# Patient Record
Sex: MALE | Race: WHITE | NOT HISPANIC OR LATINO | URBAN - METROPOLITAN AREA
[De-identification: names, ages, dates, MRNs, and addresses within clinical notes are randomized per-mention and may not be internally consistent; named-entity substitution may affect disease eponyms.]

---

## 2017-05-05 ENCOUNTER — TRANSFERRED RECORDS (OUTPATIENT)
Dept: HEALTH INFORMATION MANAGEMENT | Facility: CLINIC | Age: 42
End: 2017-05-05

## 2017-05-07 ENCOUNTER — HOSPITAL ENCOUNTER (INPATIENT)
Facility: CLINIC | Age: 42
LOS: 1 days | Discharge: HOME | DRG: 885 | End: 2017-05-08
Attending: PSYCHIATRY & NEUROLOGY | Admitting: PSYCHIATRY & NEUROLOGY
Payer: COMMERCIAL

## 2017-05-07 DIAGNOSIS — F25.0 SCHIZOAFFECTIVE DISORDER, BIPOLAR TYPE (H): Primary | ICD-10-CM

## 2017-05-07 PROBLEM — R45.851 SUICIDAL IDEATION: Status: ACTIVE | Noted: 2017-05-07

## 2017-05-07 PROCEDURE — 25000132 ZZH RX MED GY IP 250 OP 250 PS 637: Performed by: PSYCHIATRY & NEUROLOGY

## 2017-05-07 PROCEDURE — 12400001 ZZH R&B MH UMMC

## 2017-05-07 RX ORDER — IBUPROFEN 400 MG/1
400 TABLET, FILM COATED ORAL EVERY 6 HOURS PRN
Status: DISCONTINUED | OUTPATIENT
Start: 2017-05-07 | End: 2017-05-08 | Stop reason: HOSPADM

## 2017-05-07 RX ORDER — HYDROXYZINE HYDROCHLORIDE 25 MG/1
25-50 TABLET, FILM COATED ORAL EVERY 4 HOURS PRN
Status: DISCONTINUED | OUTPATIENT
Start: 2017-05-07 | End: 2017-05-08 | Stop reason: HOSPADM

## 2017-05-07 RX ORDER — IBUPROFEN 600 MG/1
600 TABLET, FILM COATED ORAL EVERY 6 HOURS PRN
Status: DISCONTINUED | OUTPATIENT
Start: 2017-05-07 | End: 2017-05-07

## 2017-05-07 RX ORDER — QUETIAPINE FUMARATE 25 MG/1
25 TABLET, FILM COATED ORAL 2 TIMES DAILY PRN
Status: DISCONTINUED | OUTPATIENT
Start: 2017-05-07 | End: 2017-05-07 | Stop reason: CLARIF

## 2017-05-07 RX ORDER — QUETIAPINE FUMARATE 50 MG/1
50 TABLET, FILM COATED ORAL ONCE
Status: COMPLETED | OUTPATIENT
Start: 2017-05-07 | End: 2017-05-07

## 2017-05-07 RX ORDER — OLANZAPINE 10 MG/2ML
10 INJECTION, POWDER, FOR SOLUTION INTRAMUSCULAR
Status: DISCONTINUED | OUTPATIENT
Start: 2017-05-07 | End: 2017-05-08 | Stop reason: HOSPADM

## 2017-05-07 RX ORDER — POLYETHYLENE GLYCOL 3350 17 G
2-4 POWDER IN PACKET (EA) ORAL
Status: DISCONTINUED | OUTPATIENT
Start: 2017-05-07 | End: 2017-05-08 | Stop reason: HOSPADM

## 2017-05-07 RX ORDER — QUETIAPINE FUMARATE 200 MG/1
200 TABLET, FILM COATED ORAL AT BEDTIME
Status: DISCONTINUED | OUTPATIENT
Start: 2017-05-07 | End: 2017-05-07

## 2017-05-07 RX ORDER — QUETIAPINE FUMARATE 25 MG/1
25 TABLET, FILM COATED ORAL 2 TIMES DAILY
Status: DISCONTINUED | OUTPATIENT
Start: 2017-05-07 | End: 2017-05-07 | Stop reason: CLARIF

## 2017-05-07 RX ORDER — QUETIAPINE FUMARATE 200 MG/1
400 TABLET, FILM COATED ORAL AT BEDTIME
Status: DISCONTINUED | OUTPATIENT
Start: 2017-05-08 | End: 2017-05-08 | Stop reason: HOSPADM

## 2017-05-07 RX ORDER — TRAZODONE HYDROCHLORIDE 50 MG/1
50 TABLET, FILM COATED ORAL
Status: DISCONTINUED | OUTPATIENT
Start: 2017-05-07 | End: 2017-05-08 | Stop reason: HOSPADM

## 2017-05-07 RX ORDER — QUETIAPINE FUMARATE 50 MG/1
50 TABLET, FILM COATED ORAL 2 TIMES DAILY PRN
Status: DISCONTINUED | OUTPATIENT
Start: 2017-05-07 | End: 2017-05-08 | Stop reason: HOSPADM

## 2017-05-07 RX ORDER — OLANZAPINE 10 MG/1
10 TABLET ORAL
Status: DISCONTINUED | OUTPATIENT
Start: 2017-05-07 | End: 2017-05-08 | Stop reason: HOSPADM

## 2017-05-07 RX ORDER — QUETIAPINE FUMARATE 300 MG/1
300 TABLET, FILM COATED ORAL AT BEDTIME
Status: COMPLETED | OUTPATIENT
Start: 2017-05-07 | End: 2017-05-07

## 2017-05-07 RX ORDER — ACETAMINOPHEN 325 MG/1
650 TABLET ORAL EVERY 4 HOURS PRN
Status: DISCONTINUED | OUTPATIENT
Start: 2017-05-07 | End: 2017-05-08 | Stop reason: HOSPADM

## 2017-05-07 RX ADMIN — QUETIAPINE FUMARATE 300 MG: 300 TABLET, FILM COATED ORAL at 21:05

## 2017-05-07 RX ADMIN — HYDROXYZINE HYDROCHLORIDE 50 MG: 25 TABLET ORAL at 05:33

## 2017-05-07 RX ADMIN — QUETIAPINE 50 MG: 50 TABLET, FILM COATED ORAL at 09:00

## 2017-05-07 RX ADMIN — HYDROXYZINE HYDROCHLORIDE 50 MG: 25 TABLET ORAL at 15:17

## 2017-05-07 RX ADMIN — ACETAMINOPHEN 650 MG: 325 TABLET, FILM COATED ORAL at 09:40

## 2017-05-07 RX ADMIN — ACETAMINOPHEN 650 MG: 325 TABLET, FILM COATED ORAL at 05:33

## 2017-05-07 RX ADMIN — QUETIAPINE 50 MG: 50 TABLET, FILM COATED ORAL at 18:56

## 2017-05-07 RX ADMIN — TRAZODONE HYDROCHLORIDE 50 MG: 50 TABLET ORAL at 05:33

## 2017-05-07 RX ADMIN — IBUPROFEN 600 MG: 600 TABLET ORAL at 17:08

## 2017-05-07 ASSESSMENT — ACTIVITIES OF DAILY LIVING (ADL)
ORAL_HYGIENE: INDEPENDENT
GROOMING: INDEPENDENT
DRESS: STREET CLOTHES
LAUNDRY: WITH SUPERVISION
DRESS: SCRUBS (BEHAVIORAL HEALTH)
ORAL_HYGIENE: INDEPENDENT
GROOMING: INDEPENDENT
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
## 2017-05-07 NOTE — IP AVS SNAPSHOT
[address]    [address]    [place] [address]    Phone:  [phone]                                       After Visit Summary   5/7/2017    Tesfaye Mills    MRN: [id number]           After Visit Summary Signature Page     I have received my discharge instructions, and my questions have been answered. I have discussed any challenges I see with this plan with the nurse or doctor.    ..........................................................................................................................................  Patient/Patient Representative Signature      ..........................................................................................................................................  Patient Representative Print Name and Relationship to Patient    ..................................................               ................................................  Date                                            Time    ..........................................................................................................................................  Reviewed by Signature/Title    ...................................................              ..............................................  Date                                                            Time

## 2017-05-07 NOTE — PROGRESS NOTES
05/07/17 0514   Patient Belongings   Did you bring any home meds/supplements to the hospital?  No   Patient Belongings cell phone/electronics;clothing;glasses;keys;money (see comment);shoes;wallet   Disposition of Belongings Patient Locker, Room, and Security   Belongings Search Yes   Clothing Search Yes   Second Staff ANGELES Garcia Bldg. [facility] staff   Patient Locker:    ----Cell phone, LG with scratched lense  ----Shoes, Black leather with laces  ----Key ring with 3 keys  ----$0.01 change  ----Billfold, black leather   ----lighter  ----Belt, black leather   Patient Room:  ----Appropriate clothing  ----Glasses and cloth case, dark frames  Security:  ----Valuables envelope # [id number]  Note:  ----Patient had no money or jewelry upon admission.ADMISSION:  I am responsible for any personal items that are not sent to the safe or pharmacy. [facility] is not responsible for loss, theft or damage of any property in my possession.    Patient Signature _____________________ Date/Time _____________________    Staff Signature _______________________ Date/Time _____________________    2nd Staff person, if patient is unable/unwilling to sign  ___________________________________ Date/Time _____________________  DISCHARGE:  All personal items have been returned to me.    Patient Signature _____________________ Date/Time _____________________    Staff Signature _______________________ Date/Time _____________________.

## 2017-05-07 NOTE — PROGRESS NOTES
Admitted to [facility] via EMT stretcher at 0428 from [facility]. Initially presented to ED on 5/5/17 with leg stiffness and pain thought to be due to withdrawal from Methadone. Then realized that he did not feel safe going home because he had suicidal ideation due to increasing depression and anxiety. Denies suicidal ideation at this time. Pleasant and cooperative with admission process. Given Vistaril,Tylenol and Trazadone for leg discomfort and insomnia at 0533. Appeared to sleep shortly thereafter.

## 2017-05-07 NOTE — IP AVS SNAPSHOT
MRN:[id number]                      After Visit Summary   5/7/2017    Tesfaye Mills    MRN: [id number]           Thank you!     Thank you for choosing [facility] for your care. Our goal is always to provide you with excellent care.        Patient Information     Date Of Birth          1975        Designated Caregiver       Most Recent Value    Caregiver    Will someone help with your care after discharge? no      About your hospital stay     You were admitted on:  May 7, 2017 You last received care in the:  [facility]    You were discharged on:  May 8, 2017       Who to Call     For medical emergencies, please call 911.  For non-urgent questions about your medical care, please call your primary care provider or clinic, None          Attending Provider     Provider Specialty    Marcel Puente MD Psychiatry    Wade Galo MD Psychiatry       Primary Care Provider    None Specified       No primary provider on file.        Further instructions from your care team       Behavioral Discharge Planning and Instructions      Summary:  You were admitted on 5/7/2017 for Suicidal Ideations.  You were treated by Dr. Wade Galo MD and discharged on 5/8/2017 from [facility]. You where admitted after staying having some suicidal ideation but since being in the hospital you have admitted that those statements where not true. You are being discharged to return home and follow up with your outpatient provider.     Main Diagnosis: Schizoaffective Disorder     Health Care Follow-up Appointments:     You state that you have an appointment scheduled with your provider on May 15th and will be attending it to receive a medication refill.     Major Treatments, Procedures and Findings:  You were provided with: a psychiatric assessment and milieu management    Symptoms to Report: increased confusion, mood getting worse or thoughts of suicide    Early warning signs can include: increased thoughts or behaviors  "of suicide or self-harm  increased unusual thinking, such as paranoia or hearing voices    Safety and Wellness:  Take all medicines as directed.  Make no changes unless your doctor suggests them.      Follow treatment recommendations.  Refrain from alcohol and non-prescribed drugs.  If there is a concern for safety, call 911.    Resources:   Crisis Intervention: 609.114.7864 or 317-083-0760 (TTY: 373.115.2184).  Call anytime for help.  National [place] on Mental Illness (www.mn.[place].org): 310.785.6208 or 961-217-5734.  Suicide Awareness Voices of Education (SAVE) (www.save.org): 640-689-TELG (5491)  National Suicide Prevention Line (www.mentalhealthmn.org): 917-830-IJDF (2011)  Mental Health Consumer/Survivor Network of [place] (www.mhcsn.net): 886.797.1515 or 087-121-9205  Mental Health Association of [place] (www.mentalhealth.org): 872.806.6723 or 921-470-6462  Self- Management and Recovery Training., [employer]-- Toll free: 237.731.9534  www.[employer]  Text 4 Life: txt \"LIFE\" to [address] for immediate support and crisis intervention  Crisis text line: Text \"START\" to 801-562. Free, confidential, 24/7.  Crisis Intervention: 127.434.7057 or 586-724-3963. Call anytime for help.     The treatment team has appreciated the opportunity to work with you.     If you have any questions or concerns our unit number is [phone].   You may be receiving a follow-up phone call within the next three days from a representative from behavioral health.    You have identified the best phone number to reach you as [phone]        Pending Results     No orders found for last 3 day(s).            Statement of Approval     Ordered          05/08/17 1325  I have reviewed and agree with all the recommendations and orders detailed in this document.  EFFECTIVE NOW     Approved and electronically signed by:  Javed Gunn MD             Admission Information     Date & Time Provider Department Dept. Phone    5/7/2017 Wade Galo " "MD PAM  [facility] [phone]      Your Vitals Were     Blood Pressure Pulse Temperature Respirations Height Weight    109/74 85 98.2  F (36.8  C) 16 1.803 m (5' 11\") 78.5 kg (173 lb)    BMI (Body Mass Index)                   24.13 kg/m2           [employer] Information     [employer] lets you send messages to your doctor, view your test results, renew your prescriptions, schedule appointments and more. To sign up, go to [email or web address] . Click on \"Log in\" on the left side of the screen, which will take you to the Welcome page. Then click on \"Sign up Now\" on the right side of the page.     You will be asked to enter the access code listed below, as well as some personal information. Please follow the directions to create your username and password.     Your access code is: [email or web address]  Expires: 2017  1:23 PM     Your access code will  in 90 days. If you need help or a new code, please call your [facility] clinic or [phone].        Care EveryWhere ID     This is your Care EveryWhere ID. This could be used by other organizations to access your [facility] medical records  [id number]           Review of your medicines      Notice     You have not been prescribed any medications.             Protect others around you: Learn how to safely use, store and throw away your medicines at www.disposemymeds.org.             Medication List: This is a list of all your medications and when to take them. Check marks below indicate your daily home schedule. Keep this list as a reference.      Notice     You have not been prescribed any medications.      "

## 2017-05-07 NOTE — PROGRESS NOTES
Pt was pleasant throughout the shift. Pt was very cooperative with staff and bright this morning. Pt ate meals and took medication without a problem. Pt colored with some peers and sat in the lounge watching movies with other peers. Pt denied depression, but stated that he was feeling anxious and felt there was nothing he could really do about it. Pt denies SI or SIB. Pt denies any hallucinations.      05/07/17 1344   Behavioral Health   Hallucinations denies / not responding to hallucinations   Thinking intact   Orientation date: oriented;time: oriented;person: oriented;place: oriented   Memory baseline memory   Insight admits / accepts   Eye Contact at examiner   Affect full range affect   Mood mood is calm   Physical Appearance/Attire attire appropriate to age and situation   Hygiene well groomed   Suicidality other (see comments)  (denies)   Self Injury other (see comment)  (denies)   Activity (present in milieu, social at times)   Speech clear;coherent   Medication Sensitivity no stated side effects;no observed side effects   Psychomotor / Gait balanced;steady   Sleep/Rest/Relaxation   Number of hours napping 2 hours   Safety   Suicidality status 15   Coping/Psychosocial   Verbalized Emotional State anxiety   Supportive Measures active listening utilized;positive reinforcement provided   Psycho Education   Type of Intervention 1:1 intervention   Response participates, initiates socially appropriate   Hours 0.5   Group Therapy Session   Group Attendance attended group session   Group Type community   Activities of Daily Living   Hygiene/Grooming independent   Oral Hygiene independent   Dress scrubs (behavioral health)   Room Organization independent   Activity   Activity Level of Assistance independent
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
## 2017-05-07 NOTE — PLAN OF CARE
Problem: Depression (Adult,Obstetrics,Pediatric)  Goal: Identify Related Risk Factors and Signs and Symptoms  Related risk factors and signs and symptoms are identified upon initiation of Human Response Clinical Practice Guideline (CPG)   Tesfaye SANDOVALMaren Arrived on station [facility] @ 1450, he was shown to his room and went to the lounge.  He looked comfortable on the unit and was able to advocate for his need.  He requested 50 mg Vistaril for anxiety.  He also asked about Seroquel prn's.  He knows that Dr. Perales is on the unit and going through the chart. He is currently seeing the doctor and wants to make sure that his hs Seroquel is increased.  He was informed that there is another Tesfaye and to participate in being part of the process of double checking that he is getting the correct medications etc. He is quiet, polite.
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
## 2017-05-08 VITALS
SYSTOLIC BLOOD PRESSURE: 109 MMHG | HEART RATE: 85 BPM | TEMPERATURE: 98.2 F | BODY MASS INDEX: 24.22 KG/M2 | HEIGHT: 71 IN | WEIGHT: 173 LBS | RESPIRATION RATE: 16 BRPM | DIASTOLIC BLOOD PRESSURE: 74 MMHG

## 2017-05-08 PROCEDURE — 25000128 H RX IP 250 OP 636: Performed by: STUDENT IN AN ORGANIZED HEALTH CARE EDUCATION/TRAINING PROGRAM

## 2017-05-08 PROCEDURE — 25000132 ZZH RX MED GY IP 250 OP 250 PS 637: Performed by: PSYCHIATRY & NEUROLOGY

## 2017-05-08 PROCEDURE — 99239 HOSP IP/OBS DSCHRG MGMT >30: CPT | Mod: GC | Performed by: PSYCHIATRY & NEUROLOGY

## 2017-05-08 PROCEDURE — 90686 IIV4 VACC NO PRSV 0.5 ML IM: CPT | Performed by: STUDENT IN AN ORGANIZED HEALTH CARE EDUCATION/TRAINING PROGRAM

## 2017-05-08 RX ADMIN — QUETIAPINE 50 MG: 50 TABLET, FILM COATED ORAL at 04:32

## 2017-05-08 RX ADMIN — IBUPROFEN 400 MG: 400 TABLET ORAL at 07:00

## 2017-05-08 RX ADMIN — HYDROXYZINE HYDROCHLORIDE 50 MG: 25 TABLET ORAL at 05:44

## 2017-05-08 RX ADMIN — INFLUENZA A VIRUS A/CALIFORNIA/7/2009 X-179A (H1N1) ANTIGEN (FORMALDEHYDE INACTIVATED), INFLUENZA A VIRUS A/HONG KONG/4801/2014 X-263B (H3N2) ANTIGEN (FORMALDEHYDE INACTIVATED), INFLUENZA B VIRUS B/PHUKET/3073/2013 ANTIGEN (FORMALDEHYDE INACTIVATED), AND INFLUENZA B VIRUS B/BRISBANE/60/2008 ANTIGEN (FORMALDEHYDE INACTIVATED) 0.5 ML: 15; 15; 15; 15 INJECTION, SUSPENSION INTRAMUSCULAR at 13:44

## 2017-05-08 ASSESSMENT — ACTIVITIES OF DAILY LIVING (ADL)
GROOMING: INDEPENDENT
DRESS: INDEPENDENT
ORAL_HYGIENE: INDEPENDENT
LAUNDRY: UNABLE TO COMPLETE

## 2017-05-08 NOTE — DISCHARGE INSTRUCTIONS
"Behavioral Discharge Planning and Instructions      Summary:  You were admitted on 5/7/2017 for Suicidal Ideations.  You were treated by Dr. Wade Galo MD and discharged on 5/8/2017 from [facility]. You where admitted after staying having some suicidal ideation but since being in the hospital you have admitted that those statements where not true. You are being discharged to return home and follow up with your outpatient provider.     Main Diagnosis: Schizoaffective Disorder     Health Care Follow-up Appointments:     You state that you have an appointment scheduled with your provider on May 15th and will be attending it to receive a medication refill.     Major Treatments, Procedures and Findings:  You were provided with: a psychiatric assessment and milieu management    Symptoms to Report: increased confusion, mood getting worse or thoughts of suicide    Early warning signs can include: increased thoughts or behaviors of suicide or self-harm  increased unusual thinking, such as paranoia or hearing voices    Safety and Wellness:  Take all medicines as directed.  Make no changes unless your doctor suggests them.      Follow treatment recommendations.  Refrain from alcohol and non-prescribed drugs.  If there is a concern for safety, call 911.    Resources:   Crisis Intervention: 138.752.3866 or 399-300-4815 (TTY: 670.567.8051).  Call anytime for help.  National [place] on Mental Illness (www.mn.[place].org): 367.239.2078 or 826-162-2495.  Suicide Awareness Voices of Education (SAVE) (www.save.org): 466-880-VXLD (6683)  National Suicide Prevention Line (www.mentalhealthmn.org): 080-070-QICO (4813)  Mental Health Consumer/Survivor Network of [place] (www.mhcsn.net): 601.963.2552 or 370-745-5301  Mental Health Association of [place] (www.mentalhealth.org): 506.497.9002 or 173-313-9267  Self- Management and Recovery Training., [employer]-- Toll free: 444.894.2033  www.[employer]  Text 4 Life: txt \"LIFE\" to 71228 for immediate " "support and crisis intervention  Crisis text line: Text \"START\" to 255-814. Free, confidential, 24/7.  Crisis Intervention: 758.614.3171 or 946-385-4525. Call anytime for help.     The treatment team has appreciated the opportunity to work with you.     If you have any questions or concerns our unit number is [phone].   You may be receiving a follow-up phone call within the next three days from a representative from behavioral health.    You have identified the best phone number to reach you as [phone]      "
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
## 2017-05-08 NOTE — PLAN OF CARE
Problem: Depression (Adult,Obstetrics,Pediatric)  Goal: Improved/Stable Mood  Patient will demonstrate the desired outcomes by discharge/transition of care.   Outcome: Adequate for Discharge Date Met:  05/08/17  Torres discharged 1400 via [employer] taxi to home-please see MD notes-denies SI-behavior organized-conversation clear and relevant-reviewed medication regimen and outpt tx plans and verbalized understanding-has medications at home-no complaints of pain

## 2017-05-08 NOTE — PROGRESS NOTES
Actively participating in com mtg. Remained out of his room and interacting with peers in lounge throughout the miller. Denies SI or S/E's.     05/07/17 2200   Behavioral Health   Hallucinations denies / not responding to hallucinations   Thinking intact   Orientation other (see comment)  (all spheres)   Memory baseline memory   Insight admits / accepts   Judgement (fair)   Eye Contact at examiner   Affect full range affect   Mood mood is calm   Hygiene well groomed   Suicidality other (see comments)  (denies current)   Self Injury other (see comment)  (none observed or reported)   Activity other (see comment)  (attended [facility] mtg and aprticipated)   Speech clear;coherent   Psycho Education   Type of Intervention 1:1 intervention   Response participates, initiates socially appropriate   Hours 0.5   Treatment Detail IMR   Activities of Daily Living   Hygiene/Grooming independent   Oral Hygiene independent   Dress street clothes   Laundry with supervision   Room Organization independent

## 2021-06-05 ENCOUNTER — AMBULATORY - HEALTHEAST (OUTPATIENT)
Dept: CARE COORDINATION | Facility: HOSPITAL | Age: 46
End: 2021-06-05

## 2021-06-26 NOTE — PROGRESS NOTES
S: 45/M, [facility] ED, psychosis.    B: Hx of paranoid schizophrenia, anxiety and opiate use d/o. Pt is reportedly paranoid and delusional. Pt believes the airforce cracked open his head and implanted a device so they can experience what he is experiencing. Pt also reports hearing voices that tell him they are going to cut his tongue out, kill his mom and torture his daughter. Pt denies SI and HI. No aggression reported - pt is cooperative in the ED. Last Inpt  admission in [place] last month. Pt does not have any OP services in place.  Pt reports he has only been sleeping about 3-4 hours per night.    A: Voluntary  No reported medical concerns  Covid test negative  UDS: methamphetamines, methadone and benzos    R: 4500 / [facility]   0041 - On call accepts for 4500 / [facility]. 0046 - Faxed clinical to unit. 0054- Notified unit. RN report they cannot accommodate pt until day shift as they are short staffed. Report at 0830. 0101 - Notified ISAIAS Lynn of placement and report time at 0830AM.